# Patient Record
Sex: FEMALE | Race: BLACK OR AFRICAN AMERICAN | NOT HISPANIC OR LATINO | Employment: FULL TIME | URBAN - METROPOLITAN AREA
[De-identification: names, ages, dates, MRNs, and addresses within clinical notes are randomized per-mention and may not be internally consistent; named-entity substitution may affect disease eponyms.]

---

## 2018-01-06 ENCOUNTER — HOSPITAL ENCOUNTER (EMERGENCY)
Facility: HOSPITAL | Age: 43
Discharge: HOME | End: 2018-01-06
Attending: EMERGENCY MEDICINE
Payer: COMMERCIAL

## 2018-01-06 VITALS
SYSTOLIC BLOOD PRESSURE: 187 MMHG | DIASTOLIC BLOOD PRESSURE: 109 MMHG | HEART RATE: 106 BPM | WEIGHT: 220 LBS | TEMPERATURE: 103 F | HEIGHT: 65 IN | RESPIRATION RATE: 20 BRPM | BODY MASS INDEX: 36.65 KG/M2 | OXYGEN SATURATION: 99 %

## 2018-01-06 DIAGNOSIS — J06.9 VIRAL URI WITH COUGH: Primary | ICD-10-CM

## 2018-01-06 PROCEDURE — 25000003 PHARM REV CODE 250: Performed by: EMERGENCY MEDICINE

## 2018-01-06 PROCEDURE — 99283 EMERGENCY DEPT VISIT LOW MDM: CPT

## 2018-01-06 RX ORDER — ACETAMINOPHEN 500 MG
1000 TABLET ORAL
Status: COMPLETED | OUTPATIENT
Start: 2018-01-06 | End: 2018-01-06

## 2018-01-06 RX ORDER — IBUPROFEN 600 MG/1
600 TABLET ORAL
Status: DISCONTINUED | OUTPATIENT
Start: 2018-01-06 | End: 2018-01-06 | Stop reason: HOSPADM

## 2018-01-06 RX ORDER — PROMETHAZINE HYDROCHLORIDE AND DEXTROMETHORPHAN HYDROBROMIDE 6.25; 15 MG/5ML; MG/5ML
5 SYRUP ORAL EVERY 6 HOURS PRN
Qty: 180 ML | Refills: 0 | Status: SHIPPED | OUTPATIENT
Start: 2018-01-06 | End: 2018-01-16

## 2018-01-06 RX ORDER — OSELTAMIVIR PHOSPHATE 75 MG/1
75 CAPSULE ORAL 2 TIMES DAILY
Qty: 10 CAPSULE | Refills: 0 | Status: SHIPPED | OUTPATIENT
Start: 2018-01-06 | End: 2018-01-11

## 2018-01-06 RX ORDER — DIPHENHYDRAMINE HCL 25 MG
25 CAPSULE ORAL EVERY 6 HOURS PRN
Qty: 20 CAPSULE | Refills: 0 | Status: SHIPPED | OUTPATIENT
Start: 2018-01-06

## 2018-01-06 RX ADMIN — ACETAMINOPHEN 1000 MG: 500 TABLET ORAL at 08:01

## 2018-01-07 NOTE — ED PROVIDER NOTES
Encounter Date: 1/6/2018       History     Chief Complaint   Patient presents with    Generalized Body Aches     body aches, cough, fever, headache since yesterday.  denies n/v/d or fever.     HPI   This 42-year-old female presents to the emergency room complaining of a 24-hour history of body aches facial congestion cough sore throat sharp chest pain with coughing.  The patient has a frontal head pain.  She is otherwise well.  Her sputum is clear nasal rhinorrhea is clear.  She denies other injuries or problems.  Review of patient's allergies indicates:  No Known Allergies  Past Medical History:   Diagnosis Date    Diabetes mellitus     Hypertension      No past surgical history on file.  Family History   Problem Relation Age of Onset    Diabetes Mother     Hypertension Mother     Kidney disease Father     Cancer Maternal Grandmother      Social History   Substance Use Topics    Smoking status: Never Smoker    Smokeless tobacco: Never Used    Alcohol use 0.0 oz/week      Comment: occacionally     Review of Systems  The patient was questioned specifically with regard to the following.  General: Fever, chills, sweats. Neuro: Headache. Eyes: eye problems. ENT: Ear pain, sore throat. Cardiovascular: Chest pain. Respiratory: Cough, shortness of breath. Gastrointestinal: Abdominal pain, vomiting, diarrhea. Genitourinary: Painful urination.  Musculoskeletal: Arm and leg problems. Skin: Rash.  The review of systems was negative except for the following: Fever, chills, headache, sore throat, cough, sharp chest pain with cough.  There is no respiratory distress.  The patient has generalized body aches.  Physical Exam     Initial Vitals [01/06/18 1800]   BP Pulse Resp Temp SpO2   (!) 157/85 100 18 (!) 100.7 °F (38.2 °C) 97 %      MAP       109         Physical Exam  The patient was examined specifically for the following:   General:No significant distress, Good color, Warm and dry. Head and neck:Scalp atraumatic,  Neck supple. Neurological:Appropriate conversation, Gross motor deficits. Eyes:Conjugate gaze, Clear corneas. ENT: No epistaxis. Cardiac: Regular rate and rhythm, Grossly normal heart tones. Pulmonary: Wheezing, Rales. Gastrointestinal: Abdominal tenderness, Abdominal distention. Musculoskeletal: Extremity deformity, Apparent pain with range of motion of the joints. Skin: Rash.   The findings on examination were normal except for the following: The patient has a temperature 100.7.  The lungs are clear.  The heart tones are normal.  The abdomen is soft.  Extremities are nontender.  There is no pale range of motion of any joints.  The patient has a rash the pharynx is only slightly red on no exudates there is no lymphadenopathy.  The patient is not septic ill toxic or dehydrated.  Her temperature is 100.7.  ED Course   Procedures  Labs Reviewed   POCT URINE PREGNANCY     Medical decision-making: This patient has symptoms consistent with viral URI.  She has diabetes.  She is at high risk for complications from the flu.  Pregnancy test was ordered but the patient does not wish to wait for it.  She does not think that she is pregnant she has not had sex with a man in 2 months;  she is confident that she is not pregnant  .  I will treat with Tamiflu ibuprofen and Phenergan DM Benadryl and have the patient follow-up with primary care.  I specifically doubt bacterial disease.                      ED Course      Clinical Impression:   The encounter diagnosis was Viral URI with cough.                           Allan Tang MD  01/06/18 2006

## 2018-01-07 NOTE — ED TRIAGE NOTES
"Pt arrived to ER with complaints of " I have flu like symptoms like headache, body aches and chest pains that started yesterday". Pt reports fever 102 this morning, denies Tylenol or Ibuprofen use. Pt rates the pain as 8/10 and describes it as " tightening up on me".   "

## 2018-01-07 NOTE — DISCHARGE INSTRUCTIONS
Tylenol 2 by mouth every 6 hours as needed for body aches and pain.  Benadryl, Phenergan DM, Tamiflu as directed.  Lots of liquids.  Vaporizer.  You may use a throat lozenge.  Please follow-up with your primary care doctor this week.  Rest.

## 2018-01-10 ENCOUNTER — HOSPITAL ENCOUNTER (EMERGENCY)
Facility: HOSPITAL | Age: 43
Discharge: HOME | End: 2018-01-10
Attending: EMERGENCY MEDICINE
Payer: COMMERCIAL

## 2018-01-10 VITALS
OXYGEN SATURATION: 99 % | HEART RATE: 85 BPM | RESPIRATION RATE: 18 BRPM | TEMPERATURE: 98 F | BODY MASS INDEX: 36.65 KG/M2 | HEIGHT: 65 IN | WEIGHT: 220 LBS | SYSTOLIC BLOOD PRESSURE: 128 MMHG | DIASTOLIC BLOOD PRESSURE: 72 MMHG

## 2018-01-10 DIAGNOSIS — E86.0 DEHYDRATION: ICD-10-CM

## 2018-01-10 DIAGNOSIS — J06.9 VIRAL URI: ICD-10-CM

## 2018-01-10 DIAGNOSIS — N17.9 AKI (ACUTE KIDNEY INJURY): ICD-10-CM

## 2018-01-10 DIAGNOSIS — N30.01 ACUTE CYSTITIS WITH HEMATURIA: Primary | ICD-10-CM

## 2018-01-10 DIAGNOSIS — R05.9 COUGH: ICD-10-CM

## 2018-01-10 LAB
ALBUMIN SERPL BCP-MCNC: 3 G/DL
ALP SERPL-CCNC: 76 U/L
ALT SERPL W/O P-5'-P-CCNC: 14 U/L
ANION GAP SERPL CALC-SCNC: 12 MMOL/L
AST SERPL-CCNC: 14 U/L
B-HCG UR QL: NEGATIVE
BACTERIA #/AREA URNS HPF: ABNORMAL /HPF
BASOPHILS # BLD AUTO: 0.03 K/UL
BASOPHILS NFR BLD: 0.5 %
BILIRUB SERPL-MCNC: 0.5 MG/DL
BILIRUB UR QL STRIP: NEGATIVE
BUN SERPL-MCNC: 28 MG/DL
CALCIUM SERPL-MCNC: 9.6 MG/DL
CHLORIDE SERPL-SCNC: 96 MMOL/L
CK SERPL-CCNC: 40 U/L
CLARITY UR: ABNORMAL
CO2 SERPL-SCNC: 28 MMOL/L
COLOR UR: ABNORMAL
CREAT SERPL-MCNC: 1.5 MG/DL
CTP QC/QA: YES
DIFFERENTIAL METHOD: NORMAL
EOSINOPHIL # BLD AUTO: 0.1 K/UL
EOSINOPHIL NFR BLD: 0.9 %
ERYTHROCYTE [DISTWIDTH] IN BLOOD BY AUTOMATED COUNT: 13.2 %
EST. GFR  (AFRICAN AMERICAN): 48 ML/MIN/1.73 M^2
EST. GFR  (NON AFRICAN AMERICAN): 42 ML/MIN/1.73 M^2
GLUCOSE SERPL-MCNC: 402 MG/DL
GLUCOSE UR QL STRIP: ABNORMAL
HCT VFR BLD AUTO: 37.5 %
HGB BLD-MCNC: 13.2 G/DL
HGB UR QL STRIP: ABNORMAL
HYALINE CASTS #/AREA URNS LPF: 0 /LPF
KETONES UR QL STRIP: NEGATIVE
LEUKOCYTE ESTERASE UR QL STRIP: ABNORMAL
LYMPHOCYTES # BLD AUTO: 1.5 K/UL
LYMPHOCYTES NFR BLD: 22.3 %
MCH RBC QN AUTO: 29.8 PG
MCHC RBC AUTO-ENTMCNC: 35.2 G/DL
MCV RBC AUTO: 85 FL
MICROSCOPIC COMMENT: ABNORMAL
MONOCYTES # BLD AUTO: 0.7 K/UL
MONOCYTES NFR BLD: 11.2 %
NEUTROPHILS # BLD AUTO: 4.2 K/UL
NEUTROPHILS NFR BLD: 65.1 %
NITRITE UR QL STRIP: NEGATIVE
PH UR STRIP: 5 [PH] (ref 5–8)
PLATELET # BLD AUTO: 279 K/UL
PMV BLD AUTO: 9.7 FL
POTASSIUM SERPL-SCNC: 3.7 MMOL/L
PROT SERPL-MCNC: 7.9 G/DL
PROT UR QL STRIP: ABNORMAL
RBC # BLD AUTO: 4.43 M/UL
RBC #/AREA URNS HPF: 8 /HPF (ref 0–4)
SODIUM SERPL-SCNC: 136 MMOL/L
SP GR UR STRIP: 1.02 (ref 1–1.03)
URN SPEC COLLECT METH UR: ABNORMAL
UROBILINOGEN UR STRIP-ACNC: ABNORMAL EU/DL
WBC # BLD AUTO: 6.5 K/UL
WBC #/AREA URNS HPF: >100 /HPF (ref 0–5)
YEAST URNS QL MICRO: ABNORMAL

## 2018-01-10 PROCEDURE — 87088 URINE BACTERIA CULTURE: CPT

## 2018-01-10 PROCEDURE — 99284 EMERGENCY DEPT VISIT MOD MDM: CPT | Mod: 25

## 2018-01-10 PROCEDURE — 63600175 PHARM REV CODE 636 W HCPCS: Performed by: PHYSICIAN ASSISTANT

## 2018-01-10 PROCEDURE — 85025 COMPLETE CBC W/AUTO DIFF WBC: CPT

## 2018-01-10 PROCEDURE — 87077 CULTURE AEROBIC IDENTIFY: CPT

## 2018-01-10 PROCEDURE — 82550 ASSAY OF CK (CPK): CPT

## 2018-01-10 PROCEDURE — 96361 HYDRATE IV INFUSION ADD-ON: CPT

## 2018-01-10 PROCEDURE — 87147 CULTURE TYPE IMMUNOLOGIC: CPT

## 2018-01-10 PROCEDURE — 25000003 PHARM REV CODE 250: Performed by: PHYSICIAN ASSISTANT

## 2018-01-10 PROCEDURE — 81025 URINE PREGNANCY TEST: CPT | Performed by: PHYSICIAN ASSISTANT

## 2018-01-10 PROCEDURE — 81000 URINALYSIS NONAUTO W/SCOPE: CPT

## 2018-01-10 PROCEDURE — 87186 SC STD MICRODIL/AGAR DIL: CPT

## 2018-01-10 PROCEDURE — 96374 THER/PROPH/DIAG INJ IV PUSH: CPT

## 2018-01-10 PROCEDURE — 80053 COMPREHEN METABOLIC PANEL: CPT

## 2018-01-10 PROCEDURE — 87086 URINE CULTURE/COLONY COUNT: CPT

## 2018-01-10 RX ORDER — BENZONATATE 100 MG/1
200 CAPSULE ORAL 3 TIMES DAILY PRN
Qty: 24 CAPSULE | Refills: 0 | Status: SHIPPED | OUTPATIENT
Start: 2018-01-10 | End: 2018-01-20

## 2018-01-10 RX ORDER — CEPHALEXIN 250 MG/1
500 CAPSULE ORAL
Status: COMPLETED | OUTPATIENT
Start: 2018-01-10 | End: 2018-01-10

## 2018-01-10 RX ORDER — TRAMADOL HYDROCHLORIDE 50 MG/1
50 TABLET ORAL EVERY 6 HOURS PRN
Qty: 12 TABLET | Refills: 0 | Status: SHIPPED | OUTPATIENT
Start: 2018-01-10 | End: 2018-01-20

## 2018-01-10 RX ORDER — HYDROCODONE BITARTRATE AND ACETAMINOPHEN 5; 325 MG/1; MG/1
1 TABLET ORAL
Status: COMPLETED | OUTPATIENT
Start: 2018-01-10 | End: 2018-01-10

## 2018-01-10 RX ORDER — CEPHALEXIN 500 MG/1
500 CAPSULE ORAL EVERY 12 HOURS
Qty: 14 CAPSULE | Refills: 0 | Status: SHIPPED | OUTPATIENT
Start: 2018-01-10 | End: 2018-01-17

## 2018-01-10 RX ORDER — PROMETHAZINE HYDROCHLORIDE AND CODEINE PHOSPHATE 6.25; 1 MG/5ML; MG/5ML
5 SOLUTION ORAL
Status: COMPLETED | OUTPATIENT
Start: 2018-01-10 | End: 2018-01-10

## 2018-01-10 RX ORDER — KETOROLAC TROMETHAMINE 30 MG/ML
10 INJECTION, SOLUTION INTRAMUSCULAR; INTRAVENOUS
Status: COMPLETED | OUTPATIENT
Start: 2018-01-10 | End: 2018-01-10

## 2018-01-10 RX ADMIN — HYDROCODONE BITARTRATE AND ACETAMINOPHEN 1 TABLET: 5; 325 TABLET ORAL at 03:01

## 2018-01-10 RX ADMIN — SODIUM CHLORIDE 1000 ML: 0.9 INJECTION, SOLUTION INTRAVENOUS at 03:01

## 2018-01-10 RX ADMIN — CEPHALEXIN 500 MG: 250 CAPSULE ORAL at 03:01

## 2018-01-10 RX ADMIN — KETOROLAC TROMETHAMINE 10 MG: 30 INJECTION, SOLUTION INTRAMUSCULAR at 02:01

## 2018-01-10 RX ADMIN — PROMETHAZINE HYDROCHLORIDE AND CODEINE PHOSPHATE 5 ML: 6.25; 1 SYRUP ORAL at 02:01

## 2018-01-10 RX ADMIN — SODIUM CHLORIDE 1000 ML: 0.9 INJECTION, SOLUTION INTRAVENOUS at 02:01

## 2018-01-10 NOTE — ED NOTES
"Patient presented to the ED stating that she was here on Saturday for a cough and cold and now she is feeling worse. Patient stated she is now spitting up "green sputum". Patient patient states she is aching, as well.  "

## 2018-01-10 NOTE — ED PROVIDER NOTES
Encounter Date: 1/10/2018    SCRIBE #1 NOTE: I, Galileaisamar Maurizio, am scribing for, and in the presence of,  Darrel Zimmer PA-C. I have scribed the following portions of the note - Other sections scribed: HPI, ROS.       History     Chief Complaint   Patient presents with    Generalized Body Aches     body aches, chest and nasal congestion, cough - green.  not eating x 4 days.  +diarrhea   denies vomiting or fever.     CC: Generalized Body Aches    43 y/o female with DM and HTN presents to the ED c/o 5 day hx of acute onset generalized myalgias, cough with associated chest pain, intermittent fever, nasal congestion, and decreased appetite. The symptoms are severe (7/10); palpation worsens the chest pain. The patient presented to this facility on 1/6/17 for similar symptoms where she was dx with viral URI and prescribed Benadryl, Tamiflu, and Promethazine with no relief. She has also been taking Aleve for her chest pain with transient relief. The patient experienced diarrhea after taking the medication that has resolved. The patient reports decreased urine; however, she states it's because she's been drinking less water. The patient denies sore throat, N/V/D, or chills. No other symptoms reported.      The history is provided by the patient. No  was used.     Review of patient's allergies indicates:  No Known Allergies  Past Medical History:   Diagnosis Date    Diabetes mellitus     Hypertension      History reviewed. No pertinent surgical history.  Family History   Problem Relation Age of Onset    Diabetes Mother     Hypertension Mother     Kidney disease Father     Cancer Maternal Grandmother      Social History   Substance Use Topics    Smoking status: Never Smoker    Smokeless tobacco: Never Used    Alcohol use 0.0 oz/week      Comment: occacionally     Review of Systems   Constitutional: Positive for appetite change (decreased) and fever (intermittent). Negative for chills.   HENT:  Positive for congestion. Negative for rhinorrhea and sore throat.    Eyes: Negative for redness.   Respiratory: Positive for cough. Negative for shortness of breath.    Cardiovascular: Positive for chest pain (only with coughing).   Gastrointestinal: Negative for abdominal pain, diarrhea, nausea and vomiting.   Genitourinary: Positive for decreased urine volume. Negative for difficulty urinating and dysuria.   Musculoskeletal: Positive for myalgias (generalized). Negative for back pain.   Skin: Negative for rash.   Neurological: Negative for headaches.       Physical Exam     Initial Vitals [01/10/18 1316]   BP Pulse Resp Temp SpO2   105/72 100 18 97.3 °F (36.3 °C) 98 %      MAP       83         Physical Exam    Vitals reviewed.  Constitutional: She appears well-developed and well-nourished. She is not diaphoretic. No distress.   HENT:   Head: Normocephalic and atraumatic.   Right Ear: External ear normal.   Left Ear: External ear normal.   Nose: Nose normal.   Mouth/Throat: Oropharynx is clear and moist. No oropharyngeal exudate.   Eyes: Conjunctivae are normal. No scleral icterus.   Neck: Normal range of motion. Neck supple. No JVD present.   Cardiovascular: Normal rate, regular rhythm, normal heart sounds and intact distal pulses.   Pulmonary/Chest: Breath sounds normal. No respiratory distress. She has no wheezes. She has no rhonchi. She has no rales. She exhibits no tenderness.   Abdominal: Soft. Bowel sounds are normal. She exhibits no distension and no mass. There is no tenderness. There is no rebound and no guarding.   Musculoskeletal: Normal range of motion. She exhibits no edema or tenderness.   Lymphadenopathy:     She has no cervical adenopathy.   Neurological: She is alert and oriented to person, place, and time.   Skin: Skin is warm and dry. No rash noted.         ED Course   Procedures  Labs Reviewed   COMPREHENSIVE METABOLIC PANEL - Abnormal; Notable for the following:        Result Value    Glucose  402 (*)     BUN, Bld 28 (*)     Creatinine 1.5 (*)     Albumin 3.0 (*)     eGFR if  48 (*)     eGFR if non  42 (*)     All other components within normal limits   URINALYSIS - Abnormal; Notable for the following:     Appearance, UA Cloudy (*)     Protein, UA 2+ (*)     Glucose, UA 3+ (*)     Occult Blood UA 3+ (*)     Urobilinogen, UA 2.0-3.0 (*)     Leukocytes, UA 2+ (*)     All other components within normal limits   URINALYSIS MICROSCOPIC - Abnormal; Notable for the following:     RBC, UA 8 (*)     WBC, UA >100 (*)     Bacteria, UA Moderate (*)     All other components within normal limits   CULTURE, URINE   CBC W/ AUTO DIFFERENTIAL   CK   POCT URINE PREGNANCY          X-Rays:   Independently Interpreted Readings:   Other Readings:  No evidence of pneumonia    Medical Decision Making:   Initial Assessment:   42-year-old female with hypertension and diabetes complains of generalized body aches, late in the hips and legs, as well as chest wall tenderness upon coughing.  Patient treated for influenza last week with Tamiflu.  She reported transient diarrhea.  She denies abdominal pain, recent fever, dysuria, vaginal discharge.  She also reports decreased appetite.  Differential Diagnosis:   Viral URI, pneumonia, rhabdomyolysis, sepsis, dehydration  ED Management:  Patient presents nontoxic appearing, afebrile, with a tachycardic heart rate.  HEENT exam is unremarkable.  Lungs are clear with normal work of breathing.  Abdomen soft and nontender.  No leg tenderness or edema.  Chest x-ray without evidence of pneumonia.  Labs obtained show mild MARITA, hyperglycemia without anion gap acidosis.  No leukocytosis or anemia.  UA also with evidence for infection.  She does not have pyelonephritis.  Will treat with Keflex and send urine for culture.  She treated with IV fluids, first dose of antibiotics, and supportive care in the ED.  On reevaluation patient with improved symptoms and vital signs.   Patient tolerates oral liquids and is safe for discharge with close follow-up with PCP for reevaluation.  Patient is confident she can see her PCP this week.  She was given return precautions.  Patient verbalized understanding and agreed with plan.            Scribe Attestation:   Scribe #1: I performed the above scribed service and the documentation accurately describes the services I performed. I attest to the accuracy of the note.    Attending Attestation:           Physician Attestation for Scribe:  Physician Attestation Statement for Scribe #1: I, Darrel Zimmer PA-C, reviewed documentation, as scribed by Carly Steven in my presence, and it is both accurate and complete.                 ED Course      Clinical Impression:   The primary encounter diagnosis was Acute cystitis with hematuria. Diagnoses of Cough, Viral URI, MARITA (acute kidney injury), and Dehydration were also pertinent to this visit.                           Darrel Richards PA-C  01/10/18 9624

## 2018-01-12 LAB
BACTERIA UR CULT: NORMAL
BACTERIA UR CULT: NORMAL

## 2019-08-14 ENCOUNTER — NURSE TRIAGE (OUTPATIENT)
Dept: ADMINISTRATIVE | Facility: CLINIC | Age: 44
End: 2019-08-14

## 2019-08-15 NOTE — TELEPHONE ENCOUNTER
Left message. No answer x2    Reason for Disposition   Second attempt to contact family AND no contact made.  Phone number verified.    Additional Information   Negative: Caller is angry or rude (e.g., hangs up, verbally abusive, yelling)   Negative: Caller hangs up   Negative: Caller has already spoken with the PCP and has no further questions.   Negative: Caller has already spoken with another triager and has no further questions.   Negative: Caller has already spoken with another triager or PCP AND has further questions AND triager able to answer questions.    Protocols used: NO CONTACT OR DUPLICATE CONTACT CALL-A-

## 2022-08-09 ENCOUNTER — TELEPHONE (OUTPATIENT)
Dept: BARIATRICS | Facility: CLINIC | Age: 47
End: 2022-08-09
Payer: COMMERCIAL

## 2022-10-06 ENCOUNTER — PATIENT MESSAGE (OUTPATIENT)
Dept: BARIATRICS | Facility: CLINIC | Age: 47
End: 2022-10-06
Payer: COMMERCIAL

## 2025-03-19 ENCOUNTER — OFFICE VISIT (OUTPATIENT)
Dept: PLASTIC SURGERY | Facility: CLINIC | Age: 50
End: 2025-03-19
Payer: COMMERCIAL

## 2025-03-19 VITALS
DIASTOLIC BLOOD PRESSURE: 78 MMHG | HEART RATE: 77 BPM | WEIGHT: 187.38 LBS | SYSTOLIC BLOOD PRESSURE: 130 MMHG | BODY MASS INDEX: 31.18 KG/M2

## 2025-03-19 DIAGNOSIS — L98.7 EXCESSIVE AND REDUNDANT SKIN AND SUBCUTANEOUS TISSUE: ICD-10-CM

## 2025-03-19 DIAGNOSIS — E65 ABDOMINAL PANNUS: ICD-10-CM

## 2025-03-19 DIAGNOSIS — M54.50 CHRONIC LOW BACK PAIN, UNSPECIFIED BACK PAIN LATERALITY, UNSPECIFIED WHETHER SCIATICA PRESENT: ICD-10-CM

## 2025-03-19 DIAGNOSIS — L30.4 ERYTHEMA INTERTRIGO: Primary | ICD-10-CM

## 2025-03-19 DIAGNOSIS — N39.3 STRESS INCONTINENCE: ICD-10-CM

## 2025-03-19 DIAGNOSIS — G89.29 CHRONIC LOW BACK PAIN, UNSPECIFIED BACK PAIN LATERALITY, UNSPECIFIED WHETHER SCIATICA PRESENT: ICD-10-CM

## 2025-03-19 PROCEDURE — 99999 PR PBB SHADOW E&M-EST. PATIENT-LVL IV: CPT | Mod: PBBFAC,,, | Performed by: SURGERY

## 2025-03-19 PROCEDURE — 99204 OFFICE O/P NEW MOD 45 MIN: CPT | Mod: S$GLB,,, | Performed by: SURGERY

## 2025-03-19 PROCEDURE — 3008F BODY MASS INDEX DOCD: CPT | Mod: CPTII,S$GLB,, | Performed by: SURGERY

## 2025-03-19 PROCEDURE — 3078F DIAST BP <80 MM HG: CPT | Mod: CPTII,S$GLB,, | Performed by: SURGERY

## 2025-03-19 PROCEDURE — 3075F SYST BP GE 130 - 139MM HG: CPT | Mod: CPTII,S$GLB,, | Performed by: SURGERY

## 2025-03-19 PROCEDURE — 1159F MED LIST DOCD IN RCRD: CPT | Mod: CPTII,S$GLB,, | Performed by: SURGERY

## 2025-03-19 NOTE — PROGRESS NOTES
"Subjective:      Katie Alves is a 49 y.o. year old female who [facility] 03/19/2025 with a chief complaint of chronic, macerating rashes beneath an abdominal wall pannus. Patient had a gastric sleeve procedure in 2023. Patient has lost 65 lbs. Weight has been stable for 4 months.   Patient states rashes have a foul-smelling odor, causes excoriation, and has been treated by dermatology without relief. Patient has tried medicated ointments and creams without relief of symptoms.   Patient also complains of lower back pain. Patient has tried ibuprofen, motrin, aleve without relief. Pt also reports that her PCP has rx tramadol without any relief.   Patient also reports urinary incontinence. Patient believes the weight of the pannus pushes down on the bladder.   Patient is employed as a . Smoker: Nonsmoker   Denies fever, chills, nausea, vomiting, or other systemic signs of infection.    PMH: diabetes, hypertension    Review of Systems:   Constitutional: Denies fever/chills  Respiratory: Denies shortness of breath or cough  Cardiovascular: Denies chest pain or palpitations  Gastrointestinal: Denies abdominal pain, nausea, or vomiting  Genitourinary: +stress incontinence  Skin: +excoriated rash  Neurological: Denies headaches or dizziness  Musculoskeletal: + low back pain    Objective:     Physical Exam:  Vitals:    03/19/25 1644   BP: 130/78   Pulse: 77     Height: 5'5"  Weight: 185 lbs  WD WN NAD  VSS  Normal resp effort  Abdomen: very large abdominal pannus. Skin beneath the pannus has foul-smelling odor and evidence of previous rashes.     Assessment:       1. Erythema intertrigo    2. Abdominal pannus    3. Chronic low back pain, unspecified back pain laterality, unspecified whether sciatica present    4. Excessive and redundant skin and subcutaneous tissue    5. Stress incontinence        Plan:   49 y.o. female with abd pannus  - Patient was seen and evaluated by " myself and Dr. Paresh Calderón    - Plan is panniculectomy.   - Pt would also like quote for medial thigh lift. Staff to quote.   - Clearances needed: none  - Had a long discussion with the patient that she would not be thin after this operation in any way shape or form.  I discussed with the patient that there is nothing at all cosmetic about this procedure.  This is a fully insurance procedure where we will only remove the excess skin that is causing the rashes, the back pain, and the incontinence.  The pt understands there will be no liposuction and no abdominal wall plication. Again, this is an insurance panniculectomy only and this patient will also have the umbilicus removed at this time. The patient understands all this.    - We will send the pt for photos today and submit for insurance approval.  - Risks, benefits and alternatives to surgery were discussed.   - Office staff to coordinate surgery date once insurance authorization obtained    All questions were answered. The patient was advised to call the clinic with any questions or concerns prior to their next visit.     Liya Dias PA-C  Plastic and Reconstructive Surgery   [phone]